# Patient Record
Sex: FEMALE | Race: OTHER | NOT HISPANIC OR LATINO
[De-identification: names, ages, dates, MRNs, and addresses within clinical notes are randomized per-mention and may not be internally consistent; named-entity substitution may affect disease eponyms.]

---

## 2020-03-16 PROBLEM — Z00.00 ENCOUNTER FOR PREVENTIVE HEALTH EXAMINATION: Status: ACTIVE | Noted: 2020-03-16

## 2020-04-22 ENCOUNTER — APPOINTMENT (OUTPATIENT)
Dept: ENDOCRINOLOGY | Facility: CLINIC | Age: 30
End: 2020-04-22
Payer: COMMERCIAL

## 2020-04-22 VITALS
HEIGHT: 64 IN | BODY MASS INDEX: 33.12 KG/M2 | DIASTOLIC BLOOD PRESSURE: 78 MMHG | HEART RATE: 82 BPM | WEIGHT: 194 LBS | SYSTOLIC BLOOD PRESSURE: 121 MMHG

## 2020-04-22 DIAGNOSIS — R53.82 CHRONIC FATIGUE, UNSPECIFIED: ICD-10-CM

## 2020-04-22 DIAGNOSIS — N64.52 NIPPLE DISCHARGE: ICD-10-CM

## 2020-04-22 LAB
GLUCOSE BLDC GLUCOMTR-MCNC: 62
HBA1C MFR BLD HPLC: 5.2

## 2020-04-22 PROCEDURE — 83036 HEMOGLOBIN GLYCOSYLATED A1C: CPT | Mod: QW

## 2020-04-22 PROCEDURE — 82962 GLUCOSE BLOOD TEST: CPT | Mod: NC

## 2020-04-22 PROCEDURE — 36415 COLL VENOUS BLD VENIPUNCTURE: CPT

## 2020-04-22 PROCEDURE — 99205 OFFICE O/P NEW HI 60 MIN: CPT | Mod: 25

## 2020-04-24 NOTE — HISTORY OF PRESENT ILLNESS
[FreeTextEntry1] : 28 y/o F w/ Hx of obesity\par Here for initial visit for b/l nipple discharge.\par reports b/l nipple discharge on expression, starting ~ 1 y/a. reports normal menses on OCPs, which she stopped in 2/2020. labs 3 days after OCP cessation showed undetectable FSH/LH and estrogen. PRL was wnl at 7, undiluted. TSH was wnl w/o fT4. she also reports worsening gum swelling, hair loss and fatigue. She otherwise denies any f/c, CP, SOB, palpitations, tremors, depressed mood, anxiety, palpitations, n/v, stool/urinary abn, skin/weight changes, heat/cold intolerance, HAs, breast/nipple changes, polyuria/polydipsia/nocturia or other complaints.\par she again denies any dysphagia, hoarseness, neck tenderness or new palpable masses. she again denies any family history of thyroid disorders or personal exposure to ionizing radiation.\par reports pregnancy w/o issues (vaginal) 6 y/a, was able to lactate afterwards. completed b/l US via PCP w/o reported abnormalities. Normal menses have resumed off of OCPs. no immediate plans for pregnancy

## 2020-04-24 NOTE — ASSESSMENT
[FreeTextEntry1] : b/l nipple discharge\par unclear etiology, repeat diluted prolactin. recheck pituitary labs, undetectable FSH/LH/estrogen is likely from recent OCP discontinuation, although intracranial mass will be r/o w/ MRI of the sella. check IGF-1 given physical exam and other pituitary labs. no s/s of intracranial mass effect, although new onset HAs noted. fatigue may be from 2ry hypothyroidism, check fT4.\par \par 1) Obesity, Morbid: Class I, complicated by severe DJD. High risk of metabolic syndrome and future complications. Discussed options including meds, bariatric surgery and lifestyle modification. RB and alternatives discussed. Questions answered and she verbalized understanding. Refer to nutrition and start hypocaloric, hypocarb diet in addition to exercise regimen. Refer to  now. Some weight loss (3 lbs). If no 5-7% weight loss observed on f/u, will consider med initiation.\par

## 2020-04-24 NOTE — PHYSICAL EXAM
[Well Nourished] : well nourished [Alert] : alert [Well Developed] : well developed [Normal Sclera/Conjunctiva] : normal sclera/conjunctiva [No Acute Distress] : no acute distress [EOMI] : extra ocular movement intact [No Proptosis] : no proptosis [Normal Oropharynx] : the oropharynx was normal [Thyroid Not Enlarged] : the thyroid was not enlarged [No Thyroid Nodules] : no palpable thyroid nodules [Well Healed Scar] : well healed scar [No Accessory Muscle Use] : no accessory muscle use [Clear to Auscultation] : lungs were clear to auscultation bilaterally [No Respiratory Distress] : no respiratory distress [Normal Rate] : heart rate was normal [Regular Rhythm] : with a regular rhythm [Normal S1, S2] : normal S1 and S2 [No Edema] : no peripheral edema [Pedal Pulses Normal] : the pedal pulses are present [Normal Bowel Sounds] : normal bowel sounds [Not Tender] : non-tender [Not Distended] : not distended [Soft] : abdomen soft [Normal Posterior Cervical Nodes] : no posterior cervical lymphadenopathy [Normal Anterior Cervical Nodes] : no anterior cervical lymphadenopathy [Spine Straight] : spine straight [No Spinal Tenderness] : no spinal tenderness [No Stigmata of Cushings Syndrome] : no stigmata of Cushings Syndrome [No Rash] : no rash [Normal Gait] : normal gait [Normal Strength/Tone] : muscle strength and tone were normal [Normal Reflexes] : deep tendon reflexes were 2+ and symmetric [Acanthosis Nigricans] : no acanthosis nigricans [de-identified] : hypertrophied gums, acromegalic features. [Oriented x3] : oriented to person, place, and time [No Tremors] : no tremors

## 2020-05-14 LAB
ACTH SER-ACNC: 16.1 PG/ML
ALBUMIN SERPL ELPH-MCNC: 4.7 G/DL
ALP BLD-CCNC: 86 U/L
ALT SERPL-CCNC: 15 U/L
ANION GAP SERPL CALC-SCNC: 15 MMOL/L
AST SERPL-CCNC: 18 U/L
BILIRUB SERPL-MCNC: <0.2 MG/DL
BUN SERPL-MCNC: 9 MG/DL
CALCIUM SERPL-MCNC: 10 MG/DL
CHLORIDE SERPL-SCNC: 101 MMOL/L
CO2 SERPL-SCNC: 25 MMOL/L
CREAT SERPL-MCNC: 0.58 MG/DL
DHEA-S SERPL-MCNC: 124 UG/DL
ESTRADIOL SERPL-MCNC: 58 PG/ML
FSH SERPL-MCNC: 6.9 IU/L
GLUCOSE SERPL-MCNC: 75 MG/DL
IGF-1 INTERP: NORMAL
IGF-I BLD-MCNC: 346 NG/ML
LH SERPL-ACNC: 14.5 IU/L
MONOMERIC PROLACTIN (ICMA)*: 4.2 NG/ML
PERCENT MACROPROLACTIN: 0 %
POTASSIUM SERPL-SCNC: 4.4 MMOL/L
PROLACTIN SERPL-MCNC: 5.1 NG/ML
PROLACTIN SERPL-MCNC: 5.9 NG/ML
PROLACTIN, SERUM (ICMA)*: 4.2 NG/ML
PROT SERPL-MCNC: 7.5 G/DL
SODIUM SERPL-SCNC: 141 MMOL/L
T3 SERPL-MCNC: 135 NG/DL
T4 FREE SERPL-MCNC: 0.8 NG/DL
TESTOST BND SERPL-MCNC: 2.5 PG/ML
TESTOST SERPL-MCNC: 92.4 NG/DL
THYROGLOB AB SERPL-ACNC: <20 IU/ML
THYROPEROXIDASE AB SERPL IA-ACNC: <10 IU/ML
TSH SERPL-ACNC: 1.53 UIU/ML

## 2020-06-02 ENCOUNTER — APPOINTMENT (OUTPATIENT)
Dept: ENDOCRINOLOGY | Facility: CLINIC | Age: 30
End: 2020-06-02
Payer: COMMERCIAL

## 2020-06-02 VITALS
HEART RATE: 80 BPM | BODY MASS INDEX: 33.99 KG/M2 | WEIGHT: 198 LBS | DIASTOLIC BLOOD PRESSURE: 69 MMHG | SYSTOLIC BLOOD PRESSURE: 107 MMHG

## 2020-06-02 DIAGNOSIS — E23.0 HYPOPITUITARISM: ICD-10-CM

## 2020-06-02 PROCEDURE — 36415 COLL VENOUS BLD VENIPUNCTURE: CPT

## 2020-06-02 PROCEDURE — 99214 OFFICE O/P EST MOD 30 MIN: CPT | Mod: 25

## 2020-06-03 LAB
GH SERPL-ACNC: NORMAL
GH SERPL-ACNC: NORMAL
GH SERPL-MCNC: 0.05 NG/ML
GH SERPL-MCNC: 0.29 NG/ML
GH SERPL-MCNC: <0.05 NG/ML
GLUCOSE 1H P 100 G GLC PO SERPL-MCNC: 120 MG/DL
GLUCOSE 2H P 100 G GLC PO SERPL-MCNC: 116 MG/DL
GLUCOSE BS SERPL-MCNC: 117 MG/DL
GLUCOSE SERPL-MCNC: 78 MG/DL
HCG SERPL-MCNC: 162 MIU/ML
IGF-1 INTERP: NORMAL
IGF-I BLD-MCNC: 257 NG/ML

## 2020-06-05 RX ORDER — FLUTICASONE PROPIONATE 50 UG/1
50 SPRAY, METERED NASAL
Qty: 48 | Refills: 0 | Status: ACTIVE | COMMUNITY
Start: 2020-03-11

## 2020-06-05 NOTE — HISTORY OF PRESENT ILLNESS
[FreeTextEntry1] : 31 y/o F w/ Hx of obesity\par Here for f/u visit for b/l nipple discharge.\par reports b/l nipple discharge on expression, starting ~ 1 y/a. reports normal menses on OCPs, which she stopped in 2/2020. labs 3 days after OCP cessation showed undetectable FSH/LH and estrogen. PRL was wnl at 7, undiluted. TSH was wnl w/o fT4. she also reports worsening gum swelling, hair loss and fatigue. \par reports pregnancy w/o issues (vaginal) 6 y/a, was able to lactate afterwards. completed b/l US via PCP w/o reported abnormalities. Normal menses have resumed off of OCPs. no immediate plans for pregnancy\par \par 6/2020:\par Here for /fu, generally feels well and endorses no acute complaints. No interval events since LV. Today comes for scheduled GH suppression test. reports cessation of period and no more nipple discharge. She otherwise denies any f/c, CP, SOB, palpitations, tremors, depressed mood, anxiety, palpitations, n/v, stool/urinary abn, skin/weight changes, heat/cold intolerance, HAs, breast/nipple changes, polyuria/polydipsia/nocturia or other complaints.\par she again denies any dysphagia, hoarseness, neck tenderness or new palpable masses. she again denies any family history of thyroid disorders or personal exposure to ionizing radiation.\par

## 2020-06-05 NOTE — ASSESSMENT
[FreeTextEntry1] : b/l nipple discharge\par unclear etiology, repeat diluted prolactin wnl. recheck IGF and perform OGTT for GH suppression test, undetectable FSH/LH/estrogen was likely from recent OCP discontinuation, / MRI of the sella was neg..  no s/s of intracranial mass effect, although new onset HAs noted. TFTs wnl, r/o pregnancy at this time.\par \par 1) Obesity, Morbid: Class I, complicated by severe DJD. High risk of metabolic syndrome and future complications. Discussed options including meds, bariatric surgery and lifestyle modification. RB and alternatives discussed. Questions answered and she verbalized understanding. Refer to nutrition and start hypocaloric, hypocarb diet in addition to exercise regimen. Refer to  now. Some weight loss (3 lbs). If no 5-7% weight loss observed on f/u, will consider med initiation.\par

## 2020-06-05 NOTE — PHYSICAL EXAM
[Alert] : alert [Well Nourished] : well nourished [Well Developed] : well developed [No Acute Distress] : no acute distress [Normal Sclera/Conjunctiva] : normal sclera/conjunctiva [No Proptosis] : no proptosis [EOMI] : extra ocular movement intact [Normal Oropharynx] : the oropharynx was normal [Thyroid Not Enlarged] : the thyroid was not enlarged [No Thyroid Nodules] : no palpable thyroid nodules [Well Healed Scar] : well healed scar [No Respiratory Distress] : no respiratory distress [No Accessory Muscle Use] : no accessory muscle use [Clear to Auscultation] : lungs were clear to auscultation bilaterally [Normal S1, S2] : normal S1 and S2 [Normal Rate] : heart rate was normal [Regular Rhythm] : with a regular rhythm [No Edema] : no peripheral edema [Pedal Pulses Normal] : the pedal pulses are present [Normal Bowel Sounds] : normal bowel sounds [Not Tender] : non-tender [Not Distended] : not distended [Normal Anterior Cervical Nodes] : no anterior cervical lymphadenopathy [Soft] : abdomen soft [Spine Straight] : spine straight [No Spinal Tenderness] : no spinal tenderness [No Stigmata of Cushings Syndrome] : no stigmata of Cushings Syndrome [Normal Gait] : normal gait [No Rash] : no rash [Normal Strength/Tone] : muscle strength and tone were normal [Normal Reflexes] : deep tendon reflexes were 2+ and symmetric [Acanthosis Nigricans] : no acanthosis nigricans [No Tremors] : no tremors [Oriented x3] : oriented to person, place, and time [de-identified] : hypertrophied gums, acromegalic features.

## 2020-07-22 ENCOUNTER — APPOINTMENT (OUTPATIENT)
Dept: ANTEPARTUM | Facility: CLINIC | Age: 30
End: 2020-07-22
Payer: COMMERCIAL

## 2020-07-22 PROCEDURE — 76801 OB US < 14 WKS SINGLE FETUS: CPT

## 2020-07-22 PROCEDURE — 76813 OB US NUCHAL MEAS 1 GEST: CPT

## 2020-08-26 ENCOUNTER — APPOINTMENT (OUTPATIENT)
Dept: ANTEPARTUM | Facility: CLINIC | Age: 30
End: 2020-08-26
Payer: COMMERCIAL

## 2020-08-26 PROCEDURE — 76817 TRANSVAGINAL US OBSTETRIC: CPT

## 2020-08-26 PROCEDURE — 76805 OB US >/= 14 WKS SNGL FETUS: CPT

## 2020-09-30 ENCOUNTER — APPOINTMENT (OUTPATIENT)
Dept: ANTEPARTUM | Facility: CLINIC | Age: 30
End: 2020-09-30
Payer: COMMERCIAL

## 2020-09-30 PROCEDURE — 76816 OB US FOLLOW-UP PER FETUS: CPT

## 2020-10-07 ENCOUNTER — APPOINTMENT (OUTPATIENT)
Dept: ANTEPARTUM | Facility: CLINIC | Age: 30
End: 2020-10-07
Payer: COMMERCIAL

## 2020-10-07 PROCEDURE — 76816 OB US FOLLOW-UP PER FETUS: CPT

## 2020-11-04 ENCOUNTER — APPOINTMENT (OUTPATIENT)
Dept: ANTEPARTUM | Facility: CLINIC | Age: 30
End: 2020-11-04
Payer: COMMERCIAL

## 2020-11-04 PROCEDURE — 76819 FETAL BIOPHYS PROFIL W/O NST: CPT

## 2020-11-04 PROCEDURE — 76816 OB US FOLLOW-UP PER FETUS: CPT

## 2020-11-04 PROCEDURE — 76817 TRANSVAGINAL US OBSTETRIC: CPT

## 2020-11-04 PROCEDURE — 99072 ADDL SUPL MATRL&STAF TM PHE: CPT

## 2020-12-02 ENCOUNTER — APPOINTMENT (OUTPATIENT)
Dept: ANTEPARTUM | Facility: CLINIC | Age: 30
End: 2020-12-02
Payer: COMMERCIAL

## 2020-12-02 PROCEDURE — 76819 FETAL BIOPHYS PROFIL W/O NST: CPT

## 2020-12-02 PROCEDURE — 99072 ADDL SUPL MATRL&STAF TM PHE: CPT

## 2020-12-02 PROCEDURE — 76816 OB US FOLLOW-UP PER FETUS: CPT

## 2020-12-30 ENCOUNTER — APPOINTMENT (OUTPATIENT)
Dept: ANTEPARTUM | Facility: CLINIC | Age: 30
End: 2020-12-30
Payer: COMMERCIAL

## 2020-12-30 PROCEDURE — 76819 FETAL BIOPHYS PROFIL W/O NST: CPT

## 2020-12-30 PROCEDURE — 76816 OB US FOLLOW-UP PER FETUS: CPT

## 2020-12-30 PROCEDURE — 99072 ADDL SUPL MATRL&STAF TM PHE: CPT

## 2021-01-13 ENCOUNTER — APPOINTMENT (OUTPATIENT)
Dept: ANTEPARTUM | Facility: CLINIC | Age: 31
End: 2021-01-13
Payer: COMMERCIAL

## 2021-01-13 PROCEDURE — 76816 OB US FOLLOW-UP PER FETUS: CPT

## 2021-01-13 PROCEDURE — 76819 FETAL BIOPHYS PROFIL W/O NST: CPT

## 2021-01-13 PROCEDURE — 99072 ADDL SUPL MATRL&STAF TM PHE: CPT

## 2021-01-27 ENCOUNTER — APPOINTMENT (OUTPATIENT)
Dept: ANTEPARTUM | Facility: CLINIC | Age: 31
End: 2021-01-27
Payer: COMMERCIAL

## 2021-01-27 PROCEDURE — 76819 FETAL BIOPHYS PROFIL W/O NST: CPT

## 2021-01-27 PROCEDURE — 99072 ADDL SUPL MATRL&STAF TM PHE: CPT

## 2021-01-27 PROCEDURE — 76816 OB US FOLLOW-UP PER FETUS: CPT

## 2021-02-04 ENCOUNTER — APPOINTMENT (OUTPATIENT)
Dept: ANTEPARTUM | Facility: CLINIC | Age: 31
End: 2021-02-04
Payer: COMMERCIAL

## 2021-02-04 PROCEDURE — 76816 OB US FOLLOW-UP PER FETUS: CPT

## 2021-02-04 PROCEDURE — 99072 ADDL SUPL MATRL&STAF TM PHE: CPT

## 2021-02-04 PROCEDURE — 76819 FETAL BIOPHYS PROFIL W/O NST: CPT

## 2021-02-11 ENCOUNTER — INPATIENT (INPATIENT)
Facility: HOSPITAL | Age: 31
LOS: 2 days | Discharge: HOME | End: 2021-02-14
Attending: OBSTETRICS & GYNECOLOGY | Admitting: OBSTETRICS & GYNECOLOGY
Payer: COMMERCIAL

## 2021-02-11 ENCOUNTER — TRANSCRIPTION ENCOUNTER (OUTPATIENT)
Age: 31
End: 2021-02-11

## 2021-02-11 ENCOUNTER — APPOINTMENT (OUTPATIENT)
Dept: ANTEPARTUM | Facility: CLINIC | Age: 31
End: 2021-02-11
Payer: COMMERCIAL

## 2021-02-11 ENCOUNTER — ASOB RESULT (OUTPATIENT)
Age: 31
End: 2021-02-11

## 2021-02-11 VITALS — HEIGHT: 62 IN | WEIGHT: 220.02 LBS

## 2021-02-11 DIAGNOSIS — Z30.2 ENCOUNTER FOR STERILIZATION: ICD-10-CM

## 2021-02-11 DIAGNOSIS — Z34.83 ENCOUNTER FOR SUPERVISION OF OTHER NORMAL PREGNANCY, THIRD TRIMESTER: ICD-10-CM

## 2021-02-11 DIAGNOSIS — O41.03X0 OLIGOHYDRAMNIOS, THIRD TRIMESTER, NOT APPLICABLE OR UNSPECIFIED: ICD-10-CM

## 2021-02-11 DIAGNOSIS — Z28.09 IMMUNIZATION NOT CARRIED OUT BECAUSE OF OTHER CONTRAINDICATION: ICD-10-CM

## 2021-02-11 DIAGNOSIS — Z3A.40 40 WEEKS GESTATION OF PREGNANCY: ICD-10-CM

## 2021-02-11 LAB
ALBUMIN SERPL ELPH-MCNC: 3.5 G/DL — SIGNIFICANT CHANGE UP (ref 3.3–5)
ALP SERPL-CCNC: 184 U/L — HIGH (ref 40–120)
ALT FLD-CCNC: 14 U/L — SIGNIFICANT CHANGE UP (ref 10–45)
ANION GAP SERPL CALC-SCNC: 12 MMOL/L — SIGNIFICANT CHANGE UP (ref 5–17)
APTT BLD: 29.6 SEC — SIGNIFICANT CHANGE UP (ref 27.5–35.5)
AST SERPL-CCNC: 21 U/L — SIGNIFICANT CHANGE UP (ref 10–40)
BASOPHILS # BLD AUTO: 0.02 K/UL — SIGNIFICANT CHANGE UP (ref 0–0.2)
BASOPHILS NFR BLD AUTO: 0.2 % — SIGNIFICANT CHANGE UP (ref 0–2)
BILIRUB SERPL-MCNC: <0.2 MG/DL — SIGNIFICANT CHANGE UP (ref 0.2–1.2)
BLD GP AB SCN SERPL QL: NEGATIVE — SIGNIFICANT CHANGE UP
BUN SERPL-MCNC: 7 MG/DL — SIGNIFICANT CHANGE UP (ref 7–23)
CALCIUM SERPL-MCNC: 9.1 MG/DL — SIGNIFICANT CHANGE UP (ref 8.4–10.5)
CHLORIDE SERPL-SCNC: 105 MMOL/L — SIGNIFICANT CHANGE UP (ref 96–108)
CO2 SERPL-SCNC: 20 MMOL/L — LOW (ref 22–31)
CREAT ?TM UR-MCNC: 60 MG/DL — SIGNIFICANT CHANGE UP
CREAT SERPL-MCNC: 0.52 MG/DL — SIGNIFICANT CHANGE UP (ref 0.5–1.3)
EOSINOPHIL # BLD AUTO: 0.04 K/UL — SIGNIFICANT CHANGE UP (ref 0–0.5)
EOSINOPHIL NFR BLD AUTO: 0.4 % — SIGNIFICANT CHANGE UP (ref 0–6)
FIBRINOGEN PPP-MCNC: 413 MG/DL — SIGNIFICANT CHANGE UP (ref 258–438)
GLUCOSE SERPL-MCNC: 63 MG/DL — LOW (ref 70–99)
HBV SURFACE AG SER-ACNC: SIGNIFICANT CHANGE UP
HCT VFR BLD CALC: 38.5 % — SIGNIFICANT CHANGE UP (ref 34.5–45)
HGB BLD-MCNC: 12.4 G/DL — SIGNIFICANT CHANGE UP (ref 11.5–15.5)
HIV 1+2 AB+HIV1 P24 AG SERPL QL IA: SIGNIFICANT CHANGE UP
IMM GRANULOCYTES NFR BLD AUTO: 0.6 % — SIGNIFICANT CHANGE UP (ref 0–1.5)
INR BLD: 0.93 — SIGNIFICANT CHANGE UP (ref 0.88–1.16)
LDH SERPL L TO P-CCNC: 176 U/L — SIGNIFICANT CHANGE UP (ref 50–242)
LYMPHOCYTES # BLD AUTO: 2.83 K/UL — SIGNIFICANT CHANGE UP (ref 1–3.3)
LYMPHOCYTES # BLD AUTO: 27 % — SIGNIFICANT CHANGE UP (ref 13–44)
MCHC RBC-ENTMCNC: 26.8 PG — LOW (ref 27–34)
MCHC RBC-ENTMCNC: 32.2 GM/DL — SIGNIFICANT CHANGE UP (ref 32–36)
MCV RBC AUTO: 83.3 FL — SIGNIFICANT CHANGE UP (ref 80–100)
MONOCYTES # BLD AUTO: 0.85 K/UL — SIGNIFICANT CHANGE UP (ref 0–0.9)
MONOCYTES NFR BLD AUTO: 8.1 % — SIGNIFICANT CHANGE UP (ref 2–14)
NEUTROPHILS # BLD AUTO: 6.7 K/UL — SIGNIFICANT CHANGE UP (ref 1.8–7.4)
NEUTROPHILS NFR BLD AUTO: 63.7 % — SIGNIFICANT CHANGE UP (ref 43–77)
NRBC # BLD: 0 /100 WBCS — SIGNIFICANT CHANGE UP (ref 0–0)
PLATELET # BLD AUTO: 250 K/UL — SIGNIFICANT CHANGE UP (ref 150–400)
POTASSIUM SERPL-MCNC: 4.2 MMOL/L — SIGNIFICANT CHANGE UP (ref 3.5–5.3)
POTASSIUM SERPL-SCNC: 4.2 MMOL/L — SIGNIFICANT CHANGE UP (ref 3.5–5.3)
PROT ?TM UR-MCNC: 5 MG/DL — SIGNIFICANT CHANGE UP (ref 0–12)
PROT SERPL-MCNC: 6.8 G/DL — SIGNIFICANT CHANGE UP (ref 6–8.3)
PROT/CREAT UR-RTO: 0.1 RATIO — SIGNIFICANT CHANGE UP (ref 0–0.2)
PROTHROM AB SERPL-ACNC: 11.2 SEC — SIGNIFICANT CHANGE UP (ref 10.6–13.6)
RBC # BLD: 4.62 M/UL — SIGNIFICANT CHANGE UP (ref 3.8–5.2)
RBC # FLD: 15.7 % — HIGH (ref 10.3–14.5)
RH IG SCN BLD-IMP: POSITIVE — SIGNIFICANT CHANGE UP
RH IG SCN BLD-IMP: POSITIVE — SIGNIFICANT CHANGE UP
SARS-COV-2 RNA SPEC QL NAA+PROBE: SIGNIFICANT CHANGE UP
SODIUM SERPL-SCNC: 137 MMOL/L — SIGNIFICANT CHANGE UP (ref 135–145)
URATE SERPL-MCNC: 5.5 MG/DL — SIGNIFICANT CHANGE UP (ref 2.5–7)
WBC # BLD: 10.5 K/UL — SIGNIFICANT CHANGE UP (ref 3.8–10.5)
WBC # FLD AUTO: 10.5 K/UL — SIGNIFICANT CHANGE UP (ref 3.8–10.5)

## 2021-02-11 PROCEDURE — 76818 FETAL BIOPHYS PROFILE W/NST: CPT

## 2021-02-11 PROCEDURE — 76816 OB US FOLLOW-UP PER FETUS: CPT

## 2021-02-11 PROCEDURE — 99072 ADDL SUPL MATRL&STAF TM PHE: CPT

## 2021-02-11 RX ORDER — CITRIC ACID/SODIUM CITRATE 300-500 MG
15 SOLUTION, ORAL ORAL EVERY 6 HOURS
Refills: 0 | Status: DISCONTINUED | OUTPATIENT
Start: 2021-02-11 | End: 2021-02-12

## 2021-02-11 RX ORDER — OXYTOCIN 10 UNIT/ML
1 VIAL (ML) INJECTION
Qty: 30 | Refills: 0 | Status: DISCONTINUED | OUTPATIENT
Start: 2021-02-11 | End: 2021-02-12

## 2021-02-11 RX ORDER — FENTANYL/BUPIVACAINE/NS/PF 2MCG/ML-.1
250 PLASTIC BAG, INJECTION (ML) INJECTION
Refills: 0 | Status: DISCONTINUED | OUTPATIENT
Start: 2021-02-11 | End: 2021-02-12

## 2021-02-11 RX ORDER — OXYTOCIN 10 UNIT/ML
333.33 VIAL (ML) INJECTION
Qty: 20 | Refills: 0 | Status: DISCONTINUED | OUTPATIENT
Start: 2021-02-11 | End: 2021-02-12

## 2021-02-11 RX ORDER — SODIUM CHLORIDE 9 MG/ML
1000 INJECTION, SOLUTION INTRAVENOUS
Refills: 0 | Status: DISCONTINUED | OUTPATIENT
Start: 2021-02-11 | End: 2021-02-12

## 2021-02-11 RX ADMIN — Medication 1 MILLIUNIT(S)/MIN: at 17:13

## 2021-02-11 RX ADMIN — SODIUM CHLORIDE 125 MILLILITER(S): 9 INJECTION, SOLUTION INTRAVENOUS at 15:05

## 2021-02-12 ENCOUNTER — RESULT REVIEW (OUTPATIENT)
Age: 31
End: 2021-02-12

## 2021-02-12 LAB
RUBV IGG SER-ACNC: 8.4 INDEX — SIGNIFICANT CHANGE UP
RUBV IGG SER-IMP: POSITIVE — SIGNIFICANT CHANGE UP
SARS-COV-2 IGG SERPL QL IA: NEGATIVE — SIGNIFICANT CHANGE UP
SARS-COV-2 IGM SERPL IA-ACNC: 0.07 INDEX — SIGNIFICANT CHANGE UP
T PALLIDUM AB TITR SER: NEGATIVE — SIGNIFICANT CHANGE UP

## 2021-02-12 PROCEDURE — 88302 TISSUE EXAM BY PATHOLOGIST: CPT | Mod: 26

## 2021-02-12 RX ORDER — DIBUCAINE 1 %
1 OINTMENT (GRAM) RECTAL EVERY 6 HOURS
Refills: 0 | Status: DISCONTINUED | OUTPATIENT
Start: 2021-02-12 | End: 2021-02-14

## 2021-02-12 RX ORDER — SODIUM CHLORIDE 9 MG/ML
1000 INJECTION, SOLUTION INTRAVENOUS
Refills: 0 | Status: DISCONTINUED | OUTPATIENT
Start: 2021-02-12 | End: 2021-02-14

## 2021-02-12 RX ORDER — IBUPROFEN 200 MG
600 TABLET ORAL EVERY 6 HOURS
Refills: 0 | Status: COMPLETED | OUTPATIENT
Start: 2021-02-12 | End: 2022-01-11

## 2021-02-12 RX ORDER — TETANUS TOXOID, REDUCED DIPHTHERIA TOXOID AND ACELLULAR PERTUSSIS VACCINE, ADSORBED 5; 2.5; 8; 8; 2.5 [IU]/.5ML; [IU]/.5ML; UG/.5ML; UG/.5ML; UG/.5ML
0.5 SUSPENSION INTRAMUSCULAR ONCE
Refills: 0 | Status: DISCONTINUED | OUTPATIENT
Start: 2021-02-12 | End: 2021-02-14

## 2021-02-12 RX ORDER — OXYCODONE HYDROCHLORIDE 5 MG/1
5 TABLET ORAL
Refills: 0 | Status: DISCONTINUED | OUTPATIENT
Start: 2021-02-12 | End: 2021-02-14

## 2021-02-12 RX ORDER — PRAMOXINE HYDROCHLORIDE 150 MG/15G
1 AEROSOL, FOAM RECTAL EVERY 4 HOURS
Refills: 0 | Status: DISCONTINUED | OUTPATIENT
Start: 2021-02-12 | End: 2021-02-14

## 2021-02-12 RX ORDER — ACETAMINOPHEN 500 MG
975 TABLET ORAL
Refills: 0 | Status: DISCONTINUED | OUTPATIENT
Start: 2021-02-12 | End: 2021-02-14

## 2021-02-12 RX ORDER — SIMETHICONE 80 MG/1
80 TABLET, CHEWABLE ORAL EVERY 4 HOURS
Refills: 0 | Status: DISCONTINUED | OUTPATIENT
Start: 2021-02-12 | End: 2021-02-14

## 2021-02-12 RX ORDER — OXYTOCIN 10 UNIT/ML
333.33 VIAL (ML) INJECTION
Qty: 20 | Refills: 0 | Status: DISCONTINUED | OUTPATIENT
Start: 2021-02-12 | End: 2021-02-14

## 2021-02-12 RX ORDER — OXYCODONE HYDROCHLORIDE 5 MG/1
5 TABLET ORAL ONCE
Refills: 0 | Status: DISCONTINUED | OUTPATIENT
Start: 2021-02-12 | End: 2021-02-14

## 2021-02-12 RX ORDER — KETOROLAC TROMETHAMINE 30 MG/ML
30 SYRINGE (ML) INJECTION ONCE
Refills: 0 | Status: DISCONTINUED | OUTPATIENT
Start: 2021-02-12 | End: 2021-02-12

## 2021-02-12 RX ORDER — BENZOCAINE 10 %
1 GEL (GRAM) MUCOUS MEMBRANE EVERY 6 HOURS
Refills: 0 | Status: DISCONTINUED | OUTPATIENT
Start: 2021-02-12 | End: 2021-02-14

## 2021-02-12 RX ORDER — MAGNESIUM HYDROXIDE 400 MG/1
30 TABLET, CHEWABLE ORAL
Refills: 0 | Status: DISCONTINUED | OUTPATIENT
Start: 2021-02-12 | End: 2021-02-14

## 2021-02-12 RX ORDER — DIPHENHYDRAMINE HCL 50 MG
25 CAPSULE ORAL EVERY 6 HOURS
Refills: 0 | Status: DISCONTINUED | OUTPATIENT
Start: 2021-02-12 | End: 2021-02-14

## 2021-02-12 RX ORDER — SODIUM CHLORIDE 9 MG/ML
3 INJECTION INTRAMUSCULAR; INTRAVENOUS; SUBCUTANEOUS EVERY 8 HOURS
Refills: 0 | Status: DISCONTINUED | OUTPATIENT
Start: 2021-02-12 | End: 2021-02-14

## 2021-02-12 RX ORDER — AER TRAVELER 0.5 G/1
1 SOLUTION RECTAL; TOPICAL EVERY 4 HOURS
Refills: 0 | Status: DISCONTINUED | OUTPATIENT
Start: 2021-02-12 | End: 2021-02-14

## 2021-02-12 RX ORDER — HYDROCORTISONE 1 %
1 OINTMENT (GRAM) TOPICAL EVERY 6 HOURS
Refills: 0 | Status: DISCONTINUED | OUTPATIENT
Start: 2021-02-12 | End: 2021-02-14

## 2021-02-12 RX ORDER — LANOLIN
1 OINTMENT (GRAM) TOPICAL EVERY 6 HOURS
Refills: 0 | Status: DISCONTINUED | OUTPATIENT
Start: 2021-02-12 | End: 2021-02-14

## 2021-02-12 RX ORDER — IBUPROFEN 200 MG
600 TABLET ORAL EVERY 6 HOURS
Refills: 0 | Status: DISCONTINUED | OUTPATIENT
Start: 2021-02-12 | End: 2021-02-14

## 2021-02-12 RX ADMIN — Medication 975 MILLIGRAM(S): at 20:54

## 2021-02-12 RX ADMIN — Medication 1000 MILLIUNIT(S)/MIN: at 04:05

## 2021-02-12 RX ADMIN — SODIUM CHLORIDE 3 MILLILITER(S): 9 INJECTION INTRAMUSCULAR; INTRAVENOUS; SUBCUTANEOUS at 07:15

## 2021-02-12 RX ADMIN — Medication 30 MILLIGRAM(S): at 05:42

## 2021-02-12 NOTE — BRIEF OPERATIVE NOTE - NSICDXBRIEFPROCEDURE_GEN_ALL_CORE_FT
PROCEDURES:  Postpartum salpingectomy 12-Feb-2021 19:21:45 Partial bilateral salpingectomy performed Suni Loomis

## 2021-02-12 NOTE — BRIEF OPERATIVE NOTE - OPERATION/FINDINGS
3 cm vertical skin incision made with the scalpel at the inferior aspect of the umbilicus. Subcutaneous tissue bluntly dissected down to the fascia. Fascia grasped with kocher clamps and incised with metzenbaum scissors. Both apexes of the fascia were tagged with 0 vicryl suture. Periteoneum bluntly entered. [employer] and small tobar retractors used to expose the intraabdominal cavity. Fundus of the uterus palpated and then the left fallopian tube swept into view and grasped with babock clamps. Hand held ligasure device used to perform a partial salpingectomy. Same procedure repeated on the right side. Both pedicles inspected and found to be hemostatic. Fascia closed in a running locked fashion with 0 vicryl suture. Subcutaneous tissue closed with 2-0 plain gut suture. Skin reapproximated with 3-0 monocryl in a subcuticular fashion. Dermabond placed over the closed incision.

## 2021-02-13 RX ADMIN — Medication 975 MILLIGRAM(S): at 20:54

## 2021-02-13 RX ADMIN — Medication 600 MILLIGRAM(S): at 00:29

## 2021-02-13 RX ADMIN — MAGNESIUM HYDROXIDE 30 MILLILITER(S): 400 TABLET, CHEWABLE ORAL at 18:41

## 2021-02-13 RX ADMIN — Medication 975 MILLIGRAM(S): at 02:57

## 2021-02-13 RX ADMIN — SODIUM CHLORIDE 3 MILLILITER(S): 9 INJECTION INTRAMUSCULAR; INTRAVENOUS; SUBCUTANEOUS at 07:13

## 2021-02-13 RX ADMIN — Medication 1 APPLICATION(S): at 10:25

## 2021-02-13 RX ADMIN — Medication 975 MILLIGRAM(S): at 16:00

## 2021-02-13 RX ADMIN — Medication 600 MILLIGRAM(S): at 12:40

## 2021-02-13 RX ADMIN — Medication 600 MILLIGRAM(S): at 18:41

## 2021-02-13 RX ADMIN — Medication 1 TABLET(S): at 12:40

## 2021-02-13 RX ADMIN — Medication 600 MILLIGRAM(S): at 07:11

## 2021-02-13 RX ADMIN — Medication 975 MILLIGRAM(S): at 10:25

## 2021-02-13 NOTE — LACTATION INITIAL EVALUATION - NS LACT CON REASON FOR REQ
Infant 42 hours old at the time of this visit and was s/p NICU. Infant in the nursery and being fed formula overnight. Mother stated that infant had latched and fed well at delivery but has not been to the breast since. Educated mother on the importance of frequent breast stimulation for milk production. Mother has a scant amount of colostrum bilaterally. She is now P2 but stated her first did not latch well and ended up formula feeding. Reviewed breastfeeding education. Answered all questions. Mother does not have a breast pump for home use but will call her insurance company. Provided mother with a manual pump for home use. Informed about LC availability./multiparous mom

## 2021-02-13 NOTE — PROGRESS NOTE ADULT - SUBJECTIVE AND OBJECTIVE BOX
Patient evaluated at bedside.      She reports pain is well controlled with medications.     She has been ambulating without assistance, may in place, passing flatus, tolerating solid food and PO fluids.     She denies HA, dizziness, chest pain, palpitations, shortness of breath, n/v, heavy vaginal bleeding or perineal discomfort.    Physical Exam:  Vital Signs Last 24 Hrs  T(C): 36.7 (13 Feb 2021 05:43), Max: 37.3 (12 Feb 2021 07:15)  T(F): 98 (13 Feb 2021 05:43), Max: 99.1 (12 Feb 2021 07:15)  HR: 95 (13 Feb 2021 05:43) (81 - 110)  BP: 112/76 (13 Feb 2021 05:43) (98/56 - 136/79)  RR: 17 (13 Feb 2021 05:43) (15 - 19)  SpO2: 97% (13 Feb 2021 05:43) (96% - 100%)    GA: NAD  Abd: + BS, soft, nontender, nondistended, no rebound or guarding, uterus firm at midline below the umbilicus, incision c/d/i  : lochia WNL  Extremities: no calf tenderness                          12.4   10.50 )-----------( 250      ( 11 Feb 2021 14:51 )             38.5     02-11    137  |  105  |  7   ----------------------------<  63<L>  4.2   |  20<L>  |  0.52    Ca    9.1      11 Feb 2021 14:51    TPro  6.8  /  Alb  3.5  /  TBili  <0.2  /  DBili  x   /  AST  21  /  ALT  14  /  AlkPhos  184<H>  02-11    PT/INR - ( 11 Feb 2021 14:51 )   PT: 11.2 sec;   INR: 0.93          PTT - ( 11 Feb 2021 14:51 )  PTT:29.6 sec

## 2021-02-13 NOTE — LACTATION INITIAL EVALUATION - LACTATION INTERVENTIONS
Reviewed breastfeeding education and information in the [facility]. Answered all questions. Informed about LC availability./initiate skin to skin/initiate hand expression routine/initiate dual electric pump routine/initiate/review early breastfeeding management guidelines/initiate/review techniques for position and latch

## 2021-02-14 ENCOUNTER — TRANSCRIPTION ENCOUNTER (OUTPATIENT)
Age: 31
End: 2021-02-14

## 2021-02-14 VITALS
DIASTOLIC BLOOD PRESSURE: 80 MMHG | SYSTOLIC BLOOD PRESSURE: 126 MMHG | OXYGEN SATURATION: 98 % | HEART RATE: 100 BPM | TEMPERATURE: 98 F | RESPIRATION RATE: 18 BRPM

## 2021-02-14 PROCEDURE — 86850 RBC ANTIBODY SCREEN: CPT

## 2021-02-14 PROCEDURE — 86901 BLOOD TYPING SEROLOGIC RH(D): CPT

## 2021-02-14 PROCEDURE — 36415 COLL VENOUS BLD VENIPUNCTURE: CPT

## 2021-02-14 PROCEDURE — 84156 ASSAY OF PROTEIN URINE: CPT

## 2021-02-14 PROCEDURE — 82570 ASSAY OF URINE CREATININE: CPT

## 2021-02-14 PROCEDURE — 80053 COMPREHEN METABOLIC PANEL: CPT

## 2021-02-14 PROCEDURE — 85384 FIBRINOGEN ACTIVITY: CPT

## 2021-02-14 PROCEDURE — 85730 THROMBOPLASTIN TIME PARTIAL: CPT

## 2021-02-14 PROCEDURE — U0005: CPT

## 2021-02-14 PROCEDURE — 86762 RUBELLA ANTIBODY: CPT

## 2021-02-14 PROCEDURE — 85025 COMPLETE CBC W/AUTO DIFF WBC: CPT

## 2021-02-14 PROCEDURE — 87389 HIV-1 AG W/HIV-1&-2 AB AG IA: CPT

## 2021-02-14 PROCEDURE — 86780 TREPONEMA PALLIDUM: CPT

## 2021-02-14 PROCEDURE — 86769 SARS-COV-2 COVID-19 ANTIBODY: CPT

## 2021-02-14 PROCEDURE — 85610 PROTHROMBIN TIME: CPT

## 2021-02-14 PROCEDURE — 84550 ASSAY OF BLOOD/URIC ACID: CPT

## 2021-02-14 PROCEDURE — 59050 FETAL MONITOR W/REPORT: CPT

## 2021-02-14 PROCEDURE — 87340 HEPATITIS B SURFACE AG IA: CPT

## 2021-02-14 PROCEDURE — 88302 TISSUE EXAM BY PATHOLOGIST: CPT

## 2021-02-14 PROCEDURE — U0003: CPT

## 2021-02-14 PROCEDURE — 83615 LACTATE (LD) (LDH) ENZYME: CPT

## 2021-02-14 PROCEDURE — 86900 BLOOD TYPING SEROLOGIC ABO: CPT

## 2021-02-14 RX ADMIN — MAGNESIUM HYDROXIDE 30 MILLILITER(S): 400 TABLET, CHEWABLE ORAL at 09:25

## 2021-02-14 RX ADMIN — Medication 1 TABLET(S): at 11:49

## 2021-02-14 RX ADMIN — Medication 600 MILLIGRAM(S): at 12:00

## 2021-02-14 RX ADMIN — Medication 600 MILLIGRAM(S): at 00:01

## 2021-02-14 RX ADMIN — Medication 600 MILLIGRAM(S): at 06:31

## 2021-02-14 RX ADMIN — Medication 975 MILLIGRAM(S): at 09:15

## 2021-02-14 NOTE — DISCHARGE NOTE OB - HOSPITAL COURSE
Patient had uncomplicated, nonsurgical vaginal delivery.  Please see delivery note for details.  During postpartum course patient's vitals were stable, vaginal bleeding appropriate, and pain well controlled.  Pt received a postpartum tubal ligation on PP0, procedure was uncomplicated. On day of discharge patient was ambulating, her pain controlled with oral medications, had adequate oral intake, and was voiding freely.  Discharge instructions and precautions were given.  Will return to clinic in 1 week for an incision check.

## 2021-02-14 NOTE — DISCHARGE NOTE OB - PLAN OF CARE
Happy and healthy mother and baby Please follow-up with your OB doctor within 1 week for an incision check. You can resume a regular diet at home and you should continue your prenatal vitamins as directed. Please place nothing in the vagina for 6 weeks (no tampons, no sex, no douching, no tub baths, no swimming pools, etc). If you have severe headaches and/or vision changes, heavy bleeding, chest pain, please call your provider or go to the nearest ED. Please call your OB with any signs of symptoms of infection including fever > 100.4 degrees, severe pain, malodorous vaginal discharge or heavy bleeding requiring more than 1-2 pads/hour. You can take Motrin 600mg orally every 6 hours for pain as needed.

## 2021-02-14 NOTE — PROGRESS NOTE ADULT - ASSESSMENT
Assessment/Plan    30y y.o. now PPD#2 s/p . AfVSS. Patient is progressing toward all postpartum milestones. Pain is well-controlled on PO medications. Anticipate discharge today.    1. Pain  - Well-controlled on Motrin and Tylenol ATC    2. GI  - Tolerating regular diet without n/v  - Senna PRN    3.   - Voiding spontaneously without difficulty/pain    4. DVT prophylaxis  - Encouraging ambulation    5. Dispo  - Anticipate dispo PPD#2        Arpita Mathur MD PGY1
A/P yo 30y  s/p , PP# 1, POD1 s/p b/l tubal ligation, stable  1. Pain: OPM  2. GI: Reg  3. :  may in place, to be removed this AM with TOV after that  4. DVT prophylaxis: SCDs, ambulation  5. Dispo: PP#2 or 3 unless otherwise specified

## 2021-02-14 NOTE — DISCHARGE NOTE OB - CARE PLAN
Principal Discharge DX:	Postpartum state  Goal:	Happy and healthy mother and baby  Assessment and plan of treatment:	Please follow-up with your OB doctor within 1 week for an incision check. You can resume a regular diet at home and you should continue your prenatal vitamins as directed. Please place nothing in the vagina for 6 weeks (no tampons, no sex, no douching, no tub baths, no swimming pools, etc). If you have severe headaches and/or vision changes, heavy bleeding, chest pain, please call your provider or go to the nearest ED. Please call your OB with any signs of symptoms of infection including fever > 100.4 degrees, severe pain, malodorous vaginal discharge or heavy bleeding requiring more than 1-2 pads/hour. You can take Motrin 600mg orally every 6 hours for pain as needed.

## 2021-02-14 NOTE — DISCHARGE NOTE OB - CARE PROVIDER_API CALL
Storm Hatch  OBSTETRICS AND GYNECOLOGY  [address] [place] [id number]  Phone: [phone]  Fax: [phone]  Follow Up Time:

## 2021-02-14 NOTE — DISCHARGE NOTE OB - PATIENT PORTAL LINK FT
You can access the FollowMyHealth Patient Portal offered by [facility] by registering at the following website: [email or web address]. By joining [email or web address]’s FollowMyHealth portal, you will also be able to view your health information using other applications (apps) compatible with our system.

## 2021-02-14 NOTE — PROGRESS NOTE ADULT - SUBJECTIVE AND OBJECTIVE BOX
Patient is a 30y y.o. F now PPD #2 s/p .    NAEO. Patient was evaluated at bedside this AM. Pain is well-controlled with PO pain medications. She has been ambulating without difficulty and voiding spontaneously. She endorses decreasing vaginal bleeding.    Vital Signs Last 24 Hrs  T(C): 36.8 (2021 20:56), Max: 36.9 (2021 18:10)  T(F): 98.2 (2021 20:56), Max: 98.4 (2021 18:10)  HR: 99 (2021 20:56) (99 - 102)  BP: 119/79 (2021 20:56) (115/76 - 123/84)  BP(mean): --  RR: 18 (2021 20:56) (18 - 18)  SpO2: 99% (:56) (97% - 99%)    Physical Exam  Gen: Well-appearing. No acute distress. Resting comfortably in bed.  Resp: Breathing comfortably on RA.  Abd: Soft, non-tender, non-distended. Uterus firm at umbilicus.  Extremities: No calf tenderness.    Labs                    21 @ 07:  -  21 @ 07:00  --------------------------------------------------------  IN: 0 mL / OUT: 2025 mL / NET: -2025 mL    21 @ 07:01  -  21 @ 06:18  --------------------------------------------------------  IN: 0 mL / OUT: 1200 mL / NET: -1200 mL

## 2021-02-16 LAB — SURGICAL PATHOLOGY STUDY: SIGNIFICANT CHANGE UP

## 2022-03-04 ENCOUNTER — APPOINTMENT (OUTPATIENT)
Dept: ENDOCRINOLOGY | Facility: CLINIC | Age: 32
End: 2022-03-04
Payer: COMMERCIAL

## 2022-03-04 VITALS
DIASTOLIC BLOOD PRESSURE: 73 MMHG | HEART RATE: 90 BPM | BODY MASS INDEX: 35 KG/M2 | HEIGHT: 64 IN | SYSTOLIC BLOOD PRESSURE: 112 MMHG | WEIGHT: 205 LBS

## 2022-03-04 PROCEDURE — 99215 OFFICE O/P EST HI 40 MIN: CPT

## 2022-03-04 NOTE — HISTORY OF PRESENT ILLNESS
[FreeTextEntry1] : 32 y/o F w/ Hx of obesity\par Here for f/u visit for b/l nipple discharge.\par reports b/l nipple discharge on expression, starting ~ 1 y/a. reports normal menses on OCPs, which she stopped in 2/2020. labs 3 days after OCP cessation showed undetectable FSH/LH and estrogen. PRL was wnl at 7, undiluted. TSH was wnl w/o fT4. she also reports worsening gum swelling, hair loss and fatigue. \par reports pregnancy w/o issues (vaginal) 6 y/a, was able to lactate afterwards. completed b/l US via PCP w/o reported abnormalities. Normal menses have resumed off of OCPs. no immediate plans for pregnancy\par \par 3/2022:\par Here for /fu, generally feels well and endorses no acute complaints. Here to re establish care, interval delivery of healthy baby girl w/o delivery complications. Reports regular periods. recently underwent tube ligation. cc is weight gain. She otherwise denies any f/c, CP, SOB, palpitations, tremors, depressed mood, anxiety, palpitations, n/v, stool/urinary abn, skin/weight changes, heat/cold intolerance, HAs, breast/nipple changes, polyuria/polydipsia/nocturia or other complaints.\par she again denies any dysphagia, hoarseness, neck tenderness or new palpable masses. she again denies any family history of thyroid disorders or personal exposure to ionizing radiation.\par

## 2022-03-04 NOTE — ASSESSMENT
[FreeTextEntry1] : elevated IGF-1\par Unremarkable GH suppression test in 2020. recheck IGF/ MRI of the sella was neg in 2020.  no s/s of intracranial mass effect, although new onset HAs noted. TFTs for metabolic surveillance\par \par 1) Obesity, Morbid: Class I, complicated by preDM2. High risk of metabolic syndrome and future complications. Discussed options including meds, bariatric surgery and lifestyle modification. RB and alternatives discussed. Questions answered and she verbalized understanding. Refer to nutrition and start hypocaloric, hypocarb diet in addition to exercise regimen. Refer to  now. Some weight loss (3 lbs). If no 5-7% weight loss observed on f/u, will consider med initiation (metformin).\par  [Long Term Vascular Complications] : long term vascular complications of diabetes [Carbohydrate Consistent Diet] : carbohydrate consistent diet [Importance of Diet and Exercise] : importance of diet and exercise to improve glycemic control, achieve weight loss and improve cardiovascular health

## 2022-03-04 NOTE — PHYSICAL EXAM
[Alert] : alert [Well Nourished] : well nourished [No Acute Distress] : no acute distress [Well Developed] : well developed [Normal Sclera/Conjunctiva] : normal sclera/conjunctiva [EOMI] : extra ocular movement intact [No Proptosis] : no proptosis [Normal Oropharynx] : the oropharynx was normal [Thyroid Not Enlarged] : the thyroid was not enlarged [No Thyroid Nodules] : no palpable thyroid nodules [Well Healed Scar] : well healed scar [No Respiratory Distress] : no respiratory distress [No Accessory Muscle Use] : no accessory muscle use [Clear to Auscultation] : lungs were clear to auscultation bilaterally [Normal S1, S2] : normal S1 and S2 [Normal Rate] : heart rate was normal [Regular Rhythm] : with a regular rhythm [No Edema] : no peripheral edema [Pedal Pulses Normal] : the pedal pulses are present [Normal Bowel Sounds] : normal bowel sounds [Not Tender] : non-tender [Not Distended] : not distended [Soft] : abdomen soft [Normal Anterior Cervical Nodes] : no anterior cervical lymphadenopathy [No Spinal Tenderness] : no spinal tenderness [Spine Straight] : spine straight [No Stigmata of Cushings Syndrome] : no stigmata of Cushings Syndrome [Normal Gait] : normal gait [Normal Strength/Tone] : muscle strength and tone were normal [No Rash] : no rash [Acanthosis Nigricans] : no acanthosis nigricans [Normal Reflexes] : deep tendon reflexes were 2+ and symmetric [No Tremors] : no tremors [Oriented x3] : oriented to person, place, and time [de-identified] : hypertrophied gums, acromegalic features.

## 2022-06-10 ENCOUNTER — APPOINTMENT (OUTPATIENT)
Dept: ENDOCRINOLOGY | Facility: CLINIC | Age: 32
End: 2022-06-10

## 2022-07-28 ENCOUNTER — APPOINTMENT (OUTPATIENT)
Dept: ENDOCRINOLOGY | Facility: CLINIC | Age: 32
End: 2022-07-28

## 2022-07-28 VITALS
BODY MASS INDEX: 35.02 KG/M2 | SYSTOLIC BLOOD PRESSURE: 124 MMHG | HEART RATE: 79 BPM | WEIGHT: 204 LBS | DIASTOLIC BLOOD PRESSURE: 84 MMHG

## 2022-07-28 DIAGNOSIS — K59.09 OTHER CONSTIPATION: ICD-10-CM

## 2022-07-28 DIAGNOSIS — E66.9 OBESITY, UNSPECIFIED: ICD-10-CM

## 2022-07-28 DIAGNOSIS — R73.03 PREDIABETES.: ICD-10-CM

## 2022-07-28 DIAGNOSIS — R79.89 OTHER SPECIFIED ABNORMAL FINDINGS OF BLOOD CHEMISTRY: ICD-10-CM

## 2022-07-28 LAB
GLUCOSE BLDC GLUCOMTR-MCNC: 71
HBA1C MFR BLD HPLC: 5.3

## 2022-07-28 PROCEDURE — 83036 HEMOGLOBIN GLYCOSYLATED A1C: CPT | Mod: QW

## 2022-07-28 PROCEDURE — 82962 GLUCOSE BLOOD TEST: CPT | Mod: NC

## 2022-07-28 PROCEDURE — 99215 OFFICE O/P EST HI 40 MIN: CPT | Mod: 25

## 2022-07-28 RX ORDER — AVOBENZONE, HOMOSALATE, OCTISALATE, OCTOCRYLENE, AND OXYBENZONE 29.4; 147; 49; 25.4; 58.8 MG/ML; MG/ML; MG/ML; MG/ML; MG/ML
51.7 LOTION TOPICAL
Qty: 3 | Refills: 3 | Status: ACTIVE | COMMUNITY
Start: 2022-07-28 | End: 1900-01-01

## 2022-07-29 PROBLEM — R73.03 PREDIABETES: Status: ACTIVE | Noted: 2022-03-04

## 2022-07-29 PROBLEM — R79.89 ELEVATED INSULIN-LIKE GROWTH FACTOR 1 (IGF-1) LEVEL: Status: ACTIVE | Noted: 2020-06-02

## 2022-07-29 NOTE — HISTORY OF PRESENT ILLNESS
[FreeTextEntry1] : 31 y/o F w/ Hx of obesity\par Here for f/u visit for b/l nipple discharge.\par reports b/l nipple discharge on expression, starting ~ 1 y/a. reports normal menses on OCPs, which she stopped in 2/2020. labs 3 days after OCP cessation showed undetectable FSH/LH and estrogen. PRL was wnl at 7, undiluted. TSH was wnl w/o fT4. she also reports worsening gum swelling, hair loss and fatigue. \par reports pregnancy w/o issues (vaginal) 6 y/a, was able to lactate afterwards. completed b/l US via PCP w/o reported abnormalities. Normal menses have resumed off of OCPs. no immediate plans for pregnancy\par \par 7/2022:\par Here for /fu, generally feels well and endorses no acute complaints. Here to re establish care, interval delivery of healthy baby girl w/o delivery complications. Reports regular periods. recently underwent tube ligation. cc is weight gain. She otherwise denies any f/c, CP, SOB, palpitations, tremors, depressed mood, anxiety, palpitations, n/v, stool/urinary abn, skin/weight changes, heat/cold intolerance, HAs, breast/nipple changes, polyuria/polydipsia/nocturia or other complaints.\par she again denies any dysphagia, hoarseness, neck tenderness or new palpable masses. she again denies any family history of thyroid disorders or personal exposure to ionizing radiation.\par

## 2022-07-29 NOTE — PHYSICAL EXAM
[Alert] : alert [Well Nourished] : well nourished [No Acute Distress] : no acute distress [Well Developed] : well developed [Normal Sclera/Conjunctiva] : normal sclera/conjunctiva [EOMI] : extra ocular movement intact [No Proptosis] : no proptosis [Normal Oropharynx] : the oropharynx was normal [Thyroid Not Enlarged] : the thyroid was not enlarged [No Thyroid Nodules] : no palpable thyroid nodules [Well Healed Scar] : well healed scar [No Respiratory Distress] : no respiratory distress [No Accessory Muscle Use] : no accessory muscle use [Clear to Auscultation] : lungs were clear to auscultation bilaterally [Normal S1, S2] : normal S1 and S2 [Normal Rate] : heart rate was normal [Regular Rhythm] : with a regular rhythm [No Edema] : no peripheral edema [Pedal Pulses Normal] : the pedal pulses are present [Normal Bowel Sounds] : normal bowel sounds [Not Tender] : non-tender [Not Distended] : not distended [Soft] : abdomen soft [Normal Anterior Cervical Nodes] : no anterior cervical lymphadenopathy [No Spinal Tenderness] : no spinal tenderness [Spine Straight] : spine straight [No Stigmata of Cushings Syndrome] : no stigmata of Cushings Syndrome [Normal Gait] : normal gait [Normal Strength/Tone] : muscle strength and tone were normal [No Rash] : no rash [Acanthosis Nigricans] : no acanthosis nigricans [Normal Reflexes] : deep tendon reflexes were 2+ and symmetric [No Tremors] : no tremors [Oriented x3] : oriented to person, place, and time [de-identified] : hypertrophied gums, acromegalic features.

## 2022-07-29 NOTE — ASSESSMENT
[FreeTextEntry1] : elevated IGF-1\par Unremarkable GH suppression test in 2020. recheck IGF/ MRI of the sella was neg in 2020.  no s/s of intracranial mass effect, although new onset HAs noted. TFTs for metabolic surveillance\par \par 1) Obesity, Morbid: Class I, complicated by preDM2. High risk of metabolic syndrome and future complications. Discussed options including meds, bariatric surgery and lifestyle modification. RB and alternatives discussed. Questions answered and she verbalized understanding. Refer to nutrition and start hypocaloric, hypocarb diet in addition to exercise regimen. Refer to  now. Some weight loss (3 lbs). If no 5-7% weight loss observed on f/u, will proceed w/ med initiation (bupropion for appetite control, metamucil for chronic constipation and improved satiety prior to lunch and dinner).\par  [Long Term Vascular Complications] : long term vascular complications of diabetes [Carbohydrate Consistent Diet] : carbohydrate consistent diet [Importance of Diet and Exercise] : importance of diet and exercise to improve glycemic control, achieve weight loss and improve cardiovascular health

## 2022-08-26 RX ORDER — BUPROPION HYDROCHLORIDE 150 MG/1
150 TABLET, EXTENDED RELEASE ORAL DAILY
Qty: 90 | Refills: 1 | Status: COMPLETED | COMMUNITY
Start: 2022-07-28 | End: 2022-08-26

## 2022-08-26 RX ORDER — METFORMIN ER 500 MG 500 MG/1
500 TABLET ORAL DAILY
Qty: 180 | Refills: 1 | Status: ACTIVE | COMMUNITY
Start: 2020-06-02 | End: 1900-01-01

## 2022-09-22 ENCOUNTER — APPOINTMENT (OUTPATIENT)
Dept: ENDOCRINOLOGY | Facility: CLINIC | Age: 32
End: 2022-09-22

## 2022-12-14 ENCOUNTER — EMERGENCY (EMERGENCY)
Facility: HOSPITAL | Age: 32
LOS: 1 days | Discharge: HOME | End: 2022-12-14
Attending: EMERGENCY MEDICINE | Admitting: EMERGENCY MEDICINE

## 2022-12-14 VITALS
DIASTOLIC BLOOD PRESSURE: 87 MMHG | RESPIRATION RATE: 18 BRPM | HEART RATE: 110 BPM | SYSTOLIC BLOOD PRESSURE: 134 MMHG | TEMPERATURE: 98 F | WEIGHT: 164.91 LBS | OXYGEN SATURATION: 97 %

## 2022-12-14 VITALS
SYSTOLIC BLOOD PRESSURE: 130 MMHG | HEART RATE: 93 BPM | OXYGEN SATURATION: 99 % | TEMPERATURE: 98 F | RESPIRATION RATE: 18 BRPM | DIASTOLIC BLOOD PRESSURE: 84 MMHG

## 2022-12-14 DIAGNOSIS — R10.32 LEFT LOWER QUADRANT PAIN: ICD-10-CM

## 2022-12-14 DIAGNOSIS — M54.2 CERVICALGIA: ICD-10-CM
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
## 2022-12-14 DIAGNOSIS — M54.50 LOW BACK PAIN, UNSPECIFIED: ICD-10-CM

## 2022-12-14 DIAGNOSIS — M25.519 PAIN IN UNSPECIFIED SHOULDER: ICD-10-CM
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
## 2022-12-14 LAB
ALBUMIN SERPL ELPH-MCNC: 4 G/DL — SIGNIFICANT CHANGE UP (ref 3.4–5)
ALP SERPL-CCNC: 82 U/L — SIGNIFICANT CHANGE UP (ref 40–120)
ALT FLD-CCNC: 16 U/L — SIGNIFICANT CHANGE UP (ref 12–42)
ANION GAP SERPL CALC-SCNC: 11 MMOL/L — SIGNIFICANT CHANGE UP (ref 9–16)
AST SERPL-CCNC: 15 U/L — SIGNIFICANT CHANGE UP (ref 15–37)
BASOPHILS # BLD AUTO: 0.06 K/UL — SIGNIFICANT CHANGE UP (ref 0–0.2)
BASOPHILS NFR BLD AUTO: 0.5 % — SIGNIFICANT CHANGE UP (ref 0–2)
BILIRUB SERPL-MCNC: 0.3 MG/DL — SIGNIFICANT CHANGE UP (ref 0.2–1.2)
BUN SERPL-MCNC: 10 MG/DL — SIGNIFICANT CHANGE UP (ref 7–23)
CALCIUM SERPL-MCNC: 10 MG/DL — SIGNIFICANT CHANGE UP (ref 8.5–10.5)
CHLORIDE SERPL-SCNC: 102 MMOL/L — SIGNIFICANT CHANGE UP (ref 96–108)
CO2 SERPL-SCNC: 26 MMOL/L — SIGNIFICANT CHANGE UP (ref 22–31)
CREAT SERPL-MCNC: 0.61 MG/DL — SIGNIFICANT CHANGE UP (ref 0.5–1.3)
EGFR: 120 ML/MIN/1.73M2 — SIGNIFICANT CHANGE UP
EOSINOPHIL # BLD AUTO: 0.02 K/UL — SIGNIFICANT CHANGE UP (ref 0–0.5)
EOSINOPHIL NFR BLD AUTO: 0.2 % — SIGNIFICANT CHANGE UP (ref 0–6)
GLUCOSE SERPL-MCNC: 99 MG/DL — SIGNIFICANT CHANGE UP (ref 70–99)
HCG SERPL-ACNC: <1 MIU/ML — SIGNIFICANT CHANGE UP
HCT VFR BLD CALC: 41.7 % — SIGNIFICANT CHANGE UP (ref 34.5–45)
HGB BLD-MCNC: 13.3 G/DL — SIGNIFICANT CHANGE UP (ref 11.5–15.5)
IMM GRANULOCYTES NFR BLD AUTO: 0.2 % — SIGNIFICANT CHANGE UP (ref 0–0.9)
LYMPHOCYTES # BLD AUTO: 16.1 % — SIGNIFICANT CHANGE UP (ref 13–44)
LYMPHOCYTES # BLD AUTO: 2.03 K/UL — SIGNIFICANT CHANGE UP (ref 1–3.3)
MCHC RBC-ENTMCNC: 26.9 PG — LOW (ref 27–34)
MCHC RBC-ENTMCNC: 31.9 GM/DL — LOW (ref 32–36)
MCV RBC AUTO: 84.4 FL — SIGNIFICANT CHANGE UP (ref 80–100)
MONOCYTES # BLD AUTO: 0.72 K/UL — SIGNIFICANT CHANGE UP (ref 0–0.9)
MONOCYTES NFR BLD AUTO: 5.7 % — SIGNIFICANT CHANGE UP (ref 2–14)
NEUTROPHILS # BLD AUTO: 9.74 K/UL — HIGH (ref 1.8–7.4)
NEUTROPHILS NFR BLD AUTO: 77.3 % — HIGH (ref 43–77)
NRBC # BLD: 0 /100 WBCS — SIGNIFICANT CHANGE UP (ref 0–0)
PLATELET # BLD AUTO: 309 K/UL — SIGNIFICANT CHANGE UP (ref 150–400)
POTASSIUM SERPL-MCNC: 3.8 MMOL/L — SIGNIFICANT CHANGE UP (ref 3.5–5.3)
POTASSIUM SERPL-SCNC: 3.8 MMOL/L — SIGNIFICANT CHANGE UP (ref 3.5–5.3)
PROT SERPL-MCNC: 8.2 G/DL — SIGNIFICANT CHANGE UP (ref 6.4–8.2)
RBC # BLD: 4.94 M/UL — SIGNIFICANT CHANGE UP (ref 3.8–5.2)
RBC # FLD: 14.8 % — HIGH (ref 10.3–14.5)
SODIUM SERPL-SCNC: 139 MMOL/L — SIGNIFICANT CHANGE UP (ref 132–145)
WBC # BLD: 12.6 K/UL — HIGH (ref 3.8–10.5)
WBC # FLD AUTO: 12.6 K/UL — HIGH (ref 3.8–10.5)

## 2022-12-14 PROCEDURE — 74177 CT ABD & PELVIS W/CONTRAST: CPT | Mod: 26

## 2022-12-14 PROCEDURE — 71260 CT THORAX DX C+: CPT | Mod: 26

## 2022-12-14 PROCEDURE — 72125 CT NECK SPINE W/O DYE: CPT | Mod: 26

## 2022-12-14 PROCEDURE — 99285 EMERGENCY DEPT VISIT HI MDM: CPT

## 2022-12-14 RX ORDER — ACETAMINOPHEN 500 MG
650 TABLET ORAL ONCE
Refills: 0 | Status: COMPLETED | OUTPATIENT
Start: 2022-12-14 | End: 2022-12-14

## 2022-12-14 RX ORDER — KETOROLAC TROMETHAMINE 30 MG/ML
15 SYRINGE (ML) INJECTION ONCE
Refills: 0 | Status: DISCONTINUED | OUTPATIENT
Start: 2022-12-14 | End: 2022-12-14

## 2022-12-14 RX ADMIN — Medication 650 MILLIGRAM(S): at 17:20

## 2022-12-14 RX ADMIN — Medication 15 MILLIGRAM(S): at 16:26

## 2022-12-14 RX ADMIN — Medication 15 MILLIGRAM(S): at 17:20

## 2022-12-14 RX ADMIN — Medication 650 MILLIGRAM(S): at 16:26
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
## 2022-12-14 NOTE — ED PROVIDER NOTE - NS ED ROS FT
Other than symptoms associated with present events the following is reported:  General:  No fever, no chills, no weight loss.  HEENT:  No sore throat.  Respiratory: No cough, no dyspnea, no wheeze.  Cardiovascular:  No chest pain, no palpitations, no orthopnea.  GI: (+) ABDOMINAL PAIN. (-) no nausea/vomiting, no diarrhea.  : No dysuria, no frequency, no urgency.  Musculoskeletal: (+) NECK PAIN, BACK PAIN, TRUNK PAIN. (-) no myalgia.  Endocrine:  No generalized weakness, no polyuria.  Neurological:  No headache, no focal weakness.   Psychiatric: No emotional stress, no depression.  Derm:  No rash.  Heme:  No bruising, no bleeding.
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
## 2022-12-14 NOTE — ED PROVIDER NOTE - PATIENT PORTAL LINK FT
You can access the FollowMyHealth Patient Portal offered by [facility] by registering at the following website: [email or web address]. By joining [employer]’s FollowMyHealth portal, you will also be able to view your health information using other applications (apps) compatible with our system.

## 2022-12-14 NOTE — ED PROVIDER NOTE - PHYSICAL EXAMINATION
VITAL SIGNS: I have reviewed nursing notes and confirm.  CONSTITUTIONAL: Well-developed; well-nourished; in no acute distress.  SKIN: Skin is warm and dry.  HEAD: Normocephalic; atraumatic.  EYES: Clear bilaterally.  ENT: Airway clear.  NECK: Supple; non tender.  CARD: Regular rate and rhythm.  RESP: No respiratory distress.  ABD: LLQ TTP.  MSK: Tenderness throughout the spine.  NEURO: Alert, oriented. Grossly unremarkable.  PSYCH: Cooperative, appropriate.
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
## 2023-02-14 NOTE — PROGRESS NOTE ADULT - ATTENDING COMMENTS
Agree with plan as written for post op day 1 Tubal  Will follow closely alert/oriented to person/oriented to place/oriented to time/oriented to situation/recall memory is intact